# Patient Record
Sex: MALE
[De-identification: names, ages, dates, MRNs, and addresses within clinical notes are randomized per-mention and may not be internally consistent; named-entity substitution may affect disease eponyms.]

---

## 2018-08-17 ENCOUNTER — HOSPITAL ENCOUNTER (EMERGENCY)
Dept: HOSPITAL 41 - JD.ED | Age: 38
Discharge: HOME | End: 2018-08-17
Payer: COMMERCIAL

## 2018-08-17 DIAGNOSIS — N20.1: Primary | ICD-10-CM

## 2018-08-17 DIAGNOSIS — E66.9: ICD-10-CM

## 2018-08-17 PROCEDURE — 81001 URINALYSIS AUTO W/SCOPE: CPT

## 2018-08-17 PROCEDURE — 99284 EMERGENCY DEPT VISIT MOD MDM: CPT

## 2018-08-17 PROCEDURE — 74176 CT ABD & PELVIS W/O CONTRAST: CPT

## 2018-08-17 NOTE — CT
CT abdomen and pelvis

 

Technique: Multiple axial sections were obtained from above the dome 

of the diaphragm inferiorly to the pubic symphysis.  Intravenous and 

oral contrast was utilized.

 

Comparison: No prior abdominal imaging.

 

Findings: Proximal right ureter is dilated with slight surrounding 

inflammatory change.  Findings are caused by a proximal right ureteral

 stone measuring about 5 mm in size.  No other abnormal calcifications

 are seen along the course of the ureters.  No abnormal calcifications

 are seen within the kidneys.

 

Visualized lung bases are clear.

 

Diffuse fatty infiltration seen within the liver.  No focal 

abnormality is appreciated.  Spleen appears within normal limits.  

Adrenal glands show no nodule.  Gallbladder contains no calcified 

gallstones.  Pancreas is within normal limits.  Aorta shows no 

aneurysmal dilatation.  Mild atherosclerotic calcification seen within

 the aorta and iliac vessels.  No retroperitoneal adenopathy is seen. 

 Incidental mild diverticulosis seen within the descending and sigmoid

 colon.  No inflammatory change of diverticulitis is seen.  No pelvic 

mass or adenopathy is seen.  No free fluid is identified.  Appendix 

not definitely visualized.

 

Bone window settings were reviewed which show vacuum phenomenon and 

slight posterior disc space narrowing as well as posterior disc 

bulging and disc calcification at L5-S1.

 

Small fat-containing umbilical hernia is seen.

 

Impression:

1.  Proximal right ureteral stone measuring about 5 mm in size causing

 proximal ureteral dilatation.

2.  Fatty infiltration within the liver.

3.  Other incidental findings.

 

Diagnostic code #3

 

I agree with preliminary report issued by [facility] ([facility] report finalized 

on 08/17/18, 2:04 PM Central Time)

## 2018-08-17 NOTE — EDM.PDOC
ED HPI GENERAL MEDICAL PROBLEM





- General


Chief Complaint: Back Pain or Injury


Stated Complaint: BACK PAIN


Time Seen by Provider: 08/17/18 09:53


Source of Information: Reports: Patient


History Limitations: Reports: No Limitations





- History of Present Illness


INITIAL COMMENTS - FREE TEXT/NARRATIVE: 





The patient states that he was woken early this morning with right flank pain, 

sharp in character, that has been coming and going since. The pain sometimes 

radiates down to his right buttock, similar to prior sciatica, although the 

patient states that this is different. He has not identified any modifiers, 

like with prior sciatica. No associated nausea, vomiting, constipation, diarrhea

, urinary symptoms, or gross hematuria. No prior similar symptoms.





The patient does not have a PCP.








  ** Right Lower Back


Pain Score (Numeric/FACES): 5





- Related Data


 Allergies











Allergy/AdvReac Type Severity Reaction Status Date / Time


 


No Known Allergies Allergy   Verified 08/17/18 09:46











Home Meds: 


 Home Meds





Acetaminophen/HYDROcodone [Norco 325-5 MG] 1 - 2 tab PO Q6H PRN #20 tablet 08/17 /18 [Rx]


Ondansetron [Zofran ODT] 1 tab PO Q8H PRN #10 tab.dis 08/17/18 [Rx]


Tamsulosin HCl [Flomax] 1 cap PO DAILY PRN #5 cap.er.24h 08/17/18 [Rx]











Past Medical History


HEENT History: Reports: Impaired Vision


Other HEENT History: wears glasses


Endocrine/Metabolic History: Reports: Obesity/BMI 30+





- Past Surgical History


HEENT Surgical History: Reports: Tonsillectomy


Musculoskeletal Surgical History: Reports: ORIF (right ankle)





Social & Family History





- Tobacco Use


Smoking Status *Q: Never Smoker


Second Hand Smoke Exposure: No





- Caffeine Use


Caffeine Use: Reports: Soda





- Alcohol Use


Alcohol Use History: No





- Recreational Drug Use


Recreational Drug Use: No





- Living Situation & Occupation


Living situation: Reports: , with Spouse, with Family (3 kids)


Occupation: Employed ([employer])





ED ROS GENERAL





- Review of Systems


Review Of Systems: ROS reveals no pertinent complaints other than HPI.





ED EXAM, GENERAL





- Physical Exam


Exam: See Below


Exam Limited By: No Limitations


General Appearance: Alert, WD/WN, No Apparent Distress


Eye Exam: Bilateral Eye: EOMI, Normal Inspection


Ears: Normal External Exam, Hearing Grossly Normal


Nose: Normal Inspection, No Blood


Throat/Mouth: Normal Inspection, Normal Lips, Normal Voice, No Airway Compromise


Head: Atraumatic, Normocephalic


Neck: Normal Inspection, Full Range of Motion


Respiratory/Chest: No Respiratory Distress, Lungs Clear, Normal Breath Sounds, 

No Accessory Muscle Use


Cardiovascular: Normal Peripheral Pulses, Regular Rate, Rhythm, No Gallop, No 

JVD, No Murmur, No Rub


Peripheral Pulses: 4+: Radial (L), Radial (R)


GI/Abdominal: Normal Bowel Sounds, Soft, Non-Tender, No Organomegaly, No 

Distention, No Abnormal Bruit, No Mass, Other (Obese)


 (Male) Exam: Deferred


Rectal (Males) Exam: Deferred


Back Exam: Normal Inspection, Full Range of Motion, CVA Tenderness (R).  No: 

CVA Tenderness (L), Decreased Range of Motion, Muscle Spasm, Paraspinal 

Tenderness, Vertebral Tenderness


Extremities: Normal Inspection, Normal Range of Motion, No Pedal Edema, Normal 

Capillary Refill


Neurological: Alert, Oriented, Normal Cognition, No Motor/Sensory Deficits


Psychiatric: Normal Affect


Skin Exam: Warm, Dry, Intact, Normal Color, No Rash





Course





- Vital Signs


Last Recorded V/S: 


 Last Vital Signs











Temp  36.9 C   08/17/18 09:47


 


Pulse  80   08/17/18 09:47


 


Resp  13   08/17/18 09:47


 


BP  158/98 H  08/17/18 09:47


 


Pulse Ox  99   08/17/18 09:47














- Orders/Labs/Meds


Orders: 


 Active Orders 24 hr











 Category Date Time Status


 


 Strain Urine [RC] ASDIRECTED Care  08/17/18 12:28 Active


 


 Abdomen Pelvis wo Cont [CT] Stat Exams  08/17/18 12:10 Taken


 


 UA W/MICROSCOPIC [URIN] Stat Lab  08/17/18 11:38 Ordered











Labs: 


 Laboratory Tests











  08/17/18 Range/Units





  11:38 


 


Urine Color  Yellow  (Yellow)  


 


Urine Appearance  Clear  (Clear)  


 


Urine pH  6.0  (5.0-8.0)  


 


Ur Specific Gravity  1.020  (1.005-1.030)  


 


Urine Protein  1+ H  (Negative)  


 


Urine Glucose (UA)  Negative  (Negative)  


 


Urine Ketones  Negative  (Negative)  


 


Urine Occult Blood  3+ H  (Negative)  


 


Urine Nitrite  Negative  (Negative)  


 


Urine Bilirubin  Negative  (Negative)  


 


Urine Urobilinogen  0.2  (0.2-1.0)  


 


Ur Leukocyte Esterase  Negative  (Negative)  


 


Urine RBC   H  (0-5)  /hpf


 


Urine WBC  0-5  (0-5)  /hpf


 


Ur Epithelial Cells  0-5  (0-5)  /hpf


 


Urine Bacteria  Few  (FEW)  /hpf


 


Urine Mucus  Few  (FEW)  /hpf











Meds: 


Medications














Discontinued Medications














Generic Name Dose Route Start Last Admin





  Trade Name Freq  PRN Reason Stop Dose Admin


 


Tamsulosin HCl  0.4 mg  08/17/18 12:28  08/17/18 13:11





  Flomax  PO  08/17/18 12:29  0.4 mg





  ONETIME ONE   Administration





     





     





     





     














- Re-Assessments/Exams


Free Text/Narrative Re-Assessment/Exam: 





08/17/18 10:01


While it is possible that the patient is suffering from a muscle strain or spasm

, his symptoms are concerning for a ureterolith. I have ordered a urinalysis, 

and if there is any blood present, I will order a CT scan of the abdomen and 

pelvis without contrast to evaluate for a stone.








08/17/18 12:11


The patient's U/A finds  RBCs, consistent with a ureterolith, but no 

suggestion of a UTI. I have ordered a CT scan of the abdomen and pelvis without 

contrast to evaluate.








08/17/18 13:17


CT of the abdomen and pelvis is read by [facility] as "The RIGHT kidney 

shows mild pelvic cases with mild perinephric stranding, with a 4.5 x 3.1 x 5.6 

mm intraureteral calculus at the L3-4 level.








08/17/18 13:25


Test results discussed with the patient. I will discharge him home with 

prescriptions for Flomax, Norco, and Zofran. The patient is also to take over-

the-counter ibuprofen. He is to strain his urine. I will refer him to Dr. Edouard. I will write a note for his work through Tuesday, 8/21/2018.





Departure





- Departure


Time of Disposition: 13:26


Disposition: Home, Self-Care 01


Condition: Good


Clinical Impression: 


 Ureterolithiasis








- Discharge Information


*PRESCRIPTION DRUG MONITORING PROGRAM REVIEWED*: Not Applicable


*COPY OF PRESCRIPTION DRUG MONITORING REPORT IN PATIENT EILEEN: Not Applicable


Prescriptions: 


Acetaminophen/HYDROcodone [Norco 325-5 MG] 1 - 2 tab PO Q6H PRN #20 tablet


 PRN Reason: Pain (Severe 7-10)


Ondansetron [Zofran ODT] 1 tab PO Q8H PRN #10 tab.dis


 PRN Reason: Nausea/Vomiting


Tamsulosin HCl [Flomax] 1 cap PO DAILY PRN #5 cap.er.24h


 PRN Reason: Pain


Referrals: 


PCP,None [Primary Care Provider] - 


Kirit Edouard MD [Ordering Only Provider] - 


Forms:  ED Department Discharge, ED Return to Work/School Form


Additional Instructions: 


You were seen in the emergency room for right flank pain.





Workup in the ER included a urinalysis and a CT scan of your abdomen and pelvis.





The workup found you have a 5.6 mm stone in your mid right ureter.





Based on the size and location of the stone, it is unlikely that you will pass 

it on your own.





Take over-the-counter ibuprofen, 2-3 tablets (400-600 mg) every 8 hours, with 

food. We recommend that you take this around the clock.





You may take 1-2 tablets of the opioid pain reliever Norco up to every 6 hours, 

as needed for pain not relieved by ibuprofen. If you take Norco, do not drive 

or operate heavy machinery for 10 hours afterwards. Norco will likely cause 

constipation, so consider taking a stool softener.





Take one tablet of the anti-spasm medicine Flomax every day, starting tomorrow, 

Saturday, 8/18/2018, as needed for flank pain.





Dissolve one tablet of the anti-nausea medicine Zofran on your tongue up to 

every 8 hours, as needed for nausea/vomiting.





Stay adequately hydrated.





Strain all your urine. If you capture the stone, take it to your doctor for 

analysis.





A note for work has been provided.





Follow-up with the Urologist Dr. Kirit Edouard at the next available 

appointment.





If any other problems, please do not hesitate to return to the ER.





- My Orders


Last 24 Hours: 


My Active Orders





08/17/18 11:38


UA W/MICROSCOPIC [URIN] Stat 





08/17/18 12:10


Abdomen Pelvis wo Cont [CT] Stat 





08/17/18 12:28


Strain Urine [RC] ASDIRECTED 














- Assessment/Plan


Last 24 Hours: 


My Active Orders





08/17/18 11:38


UA W/MICROSCOPIC [URIN] Stat 





08/17/18 12:10


Abdomen Pelvis wo Cont [CT] Stat 





08/17/18 12:28


Strain Urine [RC] ASDIRECTED